# Patient Record
Sex: MALE | Race: BLACK OR AFRICAN AMERICAN | NOT HISPANIC OR LATINO | Employment: FULL TIME | URBAN - METROPOLITAN AREA
[De-identification: names, ages, dates, MRNs, and addresses within clinical notes are randomized per-mention and may not be internally consistent; named-entity substitution may affect disease eponyms.]

---

## 2024-09-26 ENCOUNTER — HOSPITAL ENCOUNTER (OUTPATIENT)
Dept: RADIOLOGY | Facility: HOSPITAL | Age: 19
Discharge: HOME | End: 2024-09-26
Payer: MEDICAID

## 2024-09-26 ENCOUNTER — OFFICE VISIT (OUTPATIENT)
Dept: ORTHOPEDIC SURGERY | Facility: HOSPITAL | Age: 19
End: 2024-09-26
Payer: MEDICAID

## 2024-09-26 VITALS — HEIGHT: 69 IN | WEIGHT: 180 LBS | BODY MASS INDEX: 26.66 KG/M2

## 2024-09-26 DIAGNOSIS — S83.004A PATELLAR DISLOCATION, RIGHT, INITIAL ENCOUNTER: ICD-10-CM

## 2024-09-26 DIAGNOSIS — M25.561 RIGHT KNEE PAIN, UNSPECIFIED CHRONICITY: ICD-10-CM

## 2024-09-26 DIAGNOSIS — M25.461 EFFUSION OF RIGHT KNEE JOINT: ICD-10-CM

## 2024-09-26 PROCEDURE — 3008F BODY MASS INDEX DOCD: CPT | Performed by: STUDENT IN AN ORGANIZED HEALTH CARE EDUCATION/TRAINING PROGRAM

## 2024-09-26 PROCEDURE — 99214 OFFICE O/P EST MOD 30 MIN: CPT | Performed by: STUDENT IN AN ORGANIZED HEALTH CARE EDUCATION/TRAINING PROGRAM

## 2024-09-26 PROCEDURE — L1812 KO ELASTIC W/JOINTS PRE OTS: HCPCS | Performed by: STUDENT IN AN ORGANIZED HEALTH CARE EDUCATION/TRAINING PROGRAM

## 2024-09-26 PROCEDURE — 73564 X-RAY EXAM KNEE 4 OR MORE: CPT | Mod: RIGHT SIDE | Performed by: RADIOLOGY

## 2024-09-26 PROCEDURE — 73564 X-RAY EXAM KNEE 4 OR MORE: CPT | Mod: RT

## 2024-09-26 PROCEDURE — 99204 OFFICE O/P NEW MOD 45 MIN: CPT | Performed by: STUDENT IN AN ORGANIZED HEALTH CARE EDUCATION/TRAINING PROGRAM

## 2024-09-26 RX ORDER — ARIPIPRAZOLE 2 MG/1
TABLET ORAL
COMMUNITY
Start: 2024-09-25

## 2024-09-26 RX ORDER — MELOXICAM 15 MG/1
15 TABLET ORAL DAILY
Qty: 30 TABLET | Refills: 0 | Status: SHIPPED | OUTPATIENT
Start: 2024-09-26 | End: 2024-10-26

## 2024-09-26 ASSESSMENT — PAIN SCALES - GENERAL: PAINLEVEL_OUTOF10: 5 - MODERATE PAIN

## 2024-09-26 ASSESSMENT — PAIN - FUNCTIONAL ASSESSMENT: PAIN_FUNCTIONAL_ASSESSMENT: 0-10

## 2024-11-15 ENCOUNTER — APPOINTMENT (OUTPATIENT)
Dept: PRIMARY CARE | Facility: CLINIC | Age: 19
End: 2024-11-15
Payer: MEDICAID

## 2024-11-15 VITALS
OXYGEN SATURATION: 98 % | HEART RATE: 105 BPM | SYSTOLIC BLOOD PRESSURE: 121 MMHG | WEIGHT: 183.4 LBS | BODY MASS INDEX: 27.16 KG/M2 | HEIGHT: 69 IN | DIASTOLIC BLOOD PRESSURE: 81 MMHG | TEMPERATURE: 97.3 F

## 2024-11-15 DIAGNOSIS — H61.21 IMPACTED CERUMEN OF RIGHT EAR: ICD-10-CM

## 2024-11-15 DIAGNOSIS — E87.6 HYPOKALEMIA: ICD-10-CM

## 2024-11-15 DIAGNOSIS — H65.02 NON-RECURRENT ACUTE SEROUS OTITIS MEDIA OF LEFT EAR: ICD-10-CM

## 2024-11-15 DIAGNOSIS — Z11.3 ROUTINE SCREENING FOR STI (SEXUALLY TRANSMITTED INFECTION): Primary | ICD-10-CM

## 2024-11-15 RX ORDER — AMOXICILLIN AND CLAVULANATE POTASSIUM 875; 125 MG/1; MG/1
875 TABLET, FILM COATED ORAL 2 TIMES DAILY
Qty: 10 TABLET | Refills: 0 | Status: SHIPPED | OUTPATIENT
Start: 2024-11-15 | End: 2024-11-20

## 2024-11-15 ASSESSMENT — PATIENT HEALTH QUESTIONNAIRE - PHQ9
1. LITTLE INTEREST OR PLEASURE IN DOING THINGS: NOT AT ALL
SUM OF ALL RESPONSES TO PHQ9 QUESTIONS 1 AND 2: 0
2. FEELING DOWN, DEPRESSED OR HOPELESS: NOT AT ALL

## 2024-11-15 ASSESSMENT — ENCOUNTER SYMPTOMS
LOSS OF SENSATION IN FEET: 0
OCCASIONAL FEELINGS OF UNSTEADINESS: 0
DEPRESSION: 0

## 2024-11-15 ASSESSMENT — PAIN SCALES - GENERAL: PAINLEVEL_OUTOF10: 0-NO PAIN

## 2024-11-15 NOTE — PROGRESS NOTES
Subjective   Patient ID: Chencho Cuevas is a 19 y.o. female who presents for Establish Care and Sinus Problem.    Hx bipolar disorder,  R knee meniscusal tear    #rhinorrhea    - Present for 2-3 days.    - Reports congestion, rhinorrhea, and pressure in the face.    - Clogged left ear.    - Denies fever, chills, nausea, or vomiting.    - Tried Mucinex but did not work well; states she did not drink water with it.    ::Likely acute otitis media (AOM) given bulging and erythematous left ear.    PLAN  - Prescribe Augmentin 500 mg BID for 5 days.      #allergy concerns  - welts when eating gluten. Contact with [facility]  - request allergist referral  PLAN  - refer to allergist    #bipolar disorder  - follows at [facility] psychiatrist  - no long on ablify    #hypokalemia  - K 3.6 May 2024    Review of Systems  As per HPI   Previous history  Past Medical History:   Diagnosis Date    Bipolar 2 disorder (Multi)      History reviewed. No pertinent surgical history.  Social History     Tobacco Use    Smoking status: Never    Smokeless tobacco: Never   Substance Use Topics    Alcohol use: Yes    Drug use: Yes     Types: Marijuana     Family History   Problem Relation Name Age of Onset    Coronary artery disease Maternal Grandmother      Hypertension Maternal Grandmother      Diabetes Maternal Grandmother      Diabetes Maternal Grandfather       Allergies   Allergen Reactions    Haldol [Haloperidol] Other     Tardive dyskinesia     Current Outpatient Medications   Medication Instructions    amoxicillin-pot clavulanate (Augmentin) 875-125 mg tablet 875 mg, oral, 2 times daily    ARIPiprazole (Abilify) 2 mg tablet     carbamide peroxide (Debrox) 6.5 % otic solution 5 drops, Right Ear, 2 times daily       Objective       Physical Exam  HENT:      Head: Normocephalic and atraumatic.      Comments: W/o sinus TTP     Right Ear: There is impacted cerumen.      Ears:      Comments: Bulging left TM      Mouth/Throat:      Pharynx: No  oropharyngeal exudate.   Eyes:      General: No scleral icterus.     Conjunctiva/sclera: Conjunctivae normal.   Cardiovascular:      Rate and Rhythm: Normal rate and regular rhythm.      Heart sounds: No murmur heard.     No friction rub. No gallop.   Pulmonary:      Effort: Pulmonary effort is normal. No respiratory distress.      Breath sounds: Normal breath sounds.   Abdominal:      General: There is no distension.      Palpations: Abdomen is soft.      Tenderness: There is no abdominal tenderness.   Musculoskeletal:         General: Normal range of motion.   Skin:     General: Skin is warm and dry.   Neurological:      General: No focal deficit present.      Mental Status: She is alert and oriented to person, place, and time.   Psychiatric:         Mood and Affect: Mood normal.       Assessment/Plan   Chencho Cuevas is a 19 y.o. female who presents for the concerns below:    Problem List Items Addressed This Visit    None  Visit Diagnoses       Routine screening for STI (sexually transmitted infection)    -  Primary    Relevant Orders    C. trachomatis / N. gonorrhoeae, Amplified    Syphilis Screen with Reflex    HIV 1/2 Antigen/Antibody Screen with Reflex to Confirmation    Non-recurrent acute serous otitis media of left ear        Relevant Medications    amoxicillin-pot clavulanate (Augmentin) 875-125 mg tablet    Impacted cerumen of right ear        Relevant Medications    carbamide peroxide (Debrox) 6.5 % otic solution    Hypokalemia        Relevant Orders    Comprehensive metabolic panel          #AOM  :: given bulging and erythematous left ear.    PLAN  - Prescribe Augmentin 500 mg BID for 5 days.      Discussed with co-signer of note Dr. Koo      Portions of this note were generated using digital voice recognition software, and may contain grammatical errors       Clifford Chirinos, DO  Family Medicine PGY-3

## 2024-11-28 NOTE — PROGRESS NOTES
I reviewed the resident/fellow's documentation and discussed the patient with the resident/fellow. I agree with the resident/fellow's medical decision making as documented in the note.    Martha Koo MD